# Patient Record
(demographics unavailable — no encounter records)

---

## 2025-02-06 NOTE — ASSESSMENT
[FreeTextEntry1] : 69 y/o male seen previous for gerd and constipation. Hx diverticulosis. Exertional tightness in chest followed by EST which resulted in recent LELAND to distal RCA July 6, 2023. Now has 8 stents total on asa and plavix. Last colonoscopy with GABE Torre MD 2018 with diverticulosis. His weight has been stable. He has been using MiraLAX every other day but still has some altered bowel movements. He complains of some epigastric discomfort in the morning despite using pantoprazole. There is no dark stool nausea or vomiting.  1/9/24; stools better with Mg Oxide. NO weight loss. NO sob or cp. Recent genetic test neg, given pts concern for pancreatic ca. CT chest with asc thoracic aorta 3.8 cm. We discussed as his last colonoscopy was in 2018 with several polyps, he should undergo surveillance colonoscopy this summer.  RTO 7/15/24 for 6 month follow up, GERD and colon cancer screening. Hx LELAND, currently on plavix. Patient has continued high fiber diet. Patient tried taking magnesium oxide for constipation, felt improvement for a short period of time. No relief from miralax. Patient is currently taking pantoprazole and famotidine, recently started feeling "hunger pangs" 30 minutes after consuming meals. Patient also takes zofran as needed for nausea. Patient reports that nausea has been worsening recently too. Denies sob, cp, diarrhea or rectal bleeding.   RTO 2/6/25 Has complaints of gerd, epigastric pain, mod severe despite famotidine. No dysphagia, no sob or cp.Has regular bms. Does minimal exercise. Reviewed previous labs with pt, with mild iron def. REcent colonoscopy reviewed with diverticulosis. EGD essentially neg.Bx with ? lymphoctyes-- will rule out celiac  IMP: 1. GERD 2. Epigastric pain 3. Diverticulosis  PLAN: 1.zofran prn nausea 2. Rabeprazole 20 mg daily 3. Cbc, cmp, iron studies, celiac, a1c, lipid 4. If sxs do not improve, will consider gastric emptying study  Thank you for allowing me to participate in this [facility] health care.

## 2025-02-06 NOTE — PHYSICAL EXAM

## 2025-02-06 NOTE — HISTORY OF PRESENT ILLNESS
[FreeTextEntry1] :  2/2018 3 dim polyps, diverticulosis. 2024 diverticluosis  [de-identified] : 6/2023 diverticulosis.

## 2025-02-07 NOTE — PHYSICAL EXAM
[General Appearance - Well Developed] : well developed [General Appearance - Well Nourished] : well nourished [Normal Appearance] : normal appearance [Well Groomed] : well groomed [General Appearance - In No Acute Distress] : no acute distress [Heart Rate And Rhythm] : heart rate and rhythm were normal [Edema] : no peripheral edema [Arterial Pulses Normal] : the pedal pulses were normal [Respiration, Rhythm And Depth] : normal respiratory rhythm and effort [Exaggerated Use Of Accessory Muscles For Inspiration] : no accessory muscle use [Auscultation Breath Sounds / Voice Sounds] : lungs were clear to auscultation bilaterally [Chest Palpation] : palpation of the chest revealed no abnormalities [Lungs Percussion] : the lungs were normal to percussion [Bowel Sounds] : normal bowel sounds [Abdomen Soft] : soft [Abdomen Tenderness] : non-tender [Abdomen Mass (___ Cm)] : no abdominal mass palpated [Abdomen Hernia] : no hernia was discovered [Costovertebral Angle Tenderness] : no ~M costovertebral angle tenderness [Urethral Meatus] : meatus normal [Penis Abnormality] : normal circumcised penis [Urinary Bladder Findings] : the bladder was normal on palpation [Scrotum] : the scrotum was normal [Rectal Exam - Seminal Vesicles] : the seminal vesicles were normal [Epididymis] : the epididymides were normal [Testes Tenderness] : no tenderness of the testes [Testes Mass (___cm)] : there were no testicular masses [Anus Abnormality] : the anus and perineum were normal [Rectal Exam - Rectum] : digital rectal exam was normal [Prostate Enlargement] : the prostate was not enlarged [Prostate Tenderness] : the prostate was not tender [Normal Station and Gait] : the gait and station were normal for the patient's age [Skin Color & Pigmentation] : normal skin color and pigmentation [Skin Turgor] : supple [] : no rash [No Focal Deficits] : no focal deficits [Sensation] : the sensory exam was normal to light touch and pinprick [Motor Exam] : the motor exam was normal [Oriented To Time, Place, And Person] : oriented to person, place, and time [Affect] : the affect was normal [Mood] : the mood was normal [Not Anxious] : not anxious [No Palpable Adenopathy] : no palpable adenopathy [Cervical Lymph Nodes Enlarged Posterior Bilaterally] : posterior cervical [Cervical Lymph Nodes Enlarged Anterior Bilaterally] : anterior cervical [Supraclavicular Lymph Nodes Enlarged Bilaterally] : supraclavicular [Axillary Lymph Nodes Enlarged Bilaterally] : axillary [Femoral Lymph Nodes Enlarged Bilaterally] : femoral [Inguinal Lymph Nodes Enlarged Bilaterally] : inguinal [de-identified] : perianal skin tag. Cosultation with colorectal surgeon advised

## 2025-02-07 NOTE — HISTORY OF PRESENT ILLNESS
[FreeTextEntry1] : The patient returns for follow-up.  Last seen October 2024.  At that time unfortunately, he has not been having success with penile self injection.  He stated he obtains bruises and swelling of his phallus.  He feels he is not injecting properly.  PMH: Patient initially presented with diabetes, CAD (8 stents), Hypercholesterol  who presents with a chief complaint of erectile dysfunction. He had a prostatectomy for BPH. He states that this has been present for the past 2 years. It causes both he and his partner great stress.  I reviewed the questionnaire he completed in detail. His erections are not modified with the degree of sexual stimulation.   He states that his erections presently are often less than 0 out of 10 in both tumescence and rigidity, insufficient for penetration.   He often ejaculates through a flaccid phallus.  He has difficulty maintaining an erection. He describes a normal libido.  His sexual dysfunction occurs with both sexual relations and masturbation.  His erections are not improved with PDE5 inhibitors.    His partner is understanding and was unable to be with him at the visit today. He is not  and has adult children.    The patient denies fevers, chills, nausea and/or vomiting and no unexplained weight loss.  His past medical history is non-contributory.  In his present occupation he has no known toxin exposure. He does not smoke and drinks socially.  He has no known drug allergies. His review of systems and past medical history demonstrates no significant urologic issues (see patient completed questionnaire). His family history demonstrates no significant urologic issues. A chaperone was offered to be present for the examination but declined by the patient.

## 2025-02-07 NOTE — ASSESSMENT
[FreeTextEntry1] : The patient returns for follow-up.  Last seen October 2024.  At that time unfortunately, he has not been having success with penile self injection.  He stated he obtains bruises and swelling of his phallus.  He feels he is not injecting properly.  Okay it is coming from [place]  Laboratory studies dated July 9, 2024 demonstrated a urinalysis with greater than 1000 mg/dL of glucose and trace ketones.  Hematocrit was 41.4% with a slightly low hemoglobin of 12.8 g/dL and medical evaluation was suggested.  His hemoglobin A1c is 6.8% and medical evaluation was suggested.  He had an elevated A1 to be lipoprotein ratio.  His PSA was 1.20 ng/mL, estradiol 25 pg/mL, prolactin 6.8 ng/mL TSH 3.70 IU/mL, LH 9.5 IU/L, vitamin D 25 was 39.7 ng/mL, testosterone free 3.8 pg/mL of total testosterone 411 ng/dL.  Penile duplex ultrasound demonstrated significant arteriogenic dysfunction. He is under cardiology care. He has 8 stents in place presently. He will be returning for injection training at 0.4 cc of the trimix.  Consultation: 35 minutes which excludes teaching and separately reported service but includes reviewing his history and discussing prior results, discussing various treatment options, writing medication prescriptions, requests for lab testing and writing his note. There was also additional time in preparing for the visit.

## 2025-02-07 NOTE — ASSESSMENT
[FreeTextEntry1] : The patient returns for follow-up.  Last seen October 2024.  At that time unfortunately, he has not been having success with penile self injection.  He stated he obtains bruises and swelling of his phallus.  He feels he is not injecting properly.  Okay it is coming from [place]  Laboratory studies dated July 9, 2024 demonstrated a urinalysis with greater than 1000 mg/dL of glucose and trace ketones.  Hematocrit was 41.4% with a slightly low hemoglobin of 12.8 g/dL and medical evaluation was suggested.  His hemoglobin A1c is 6.8% and medical evaluation was suggested.  He had an elevated A1 to be lipoprotein ratio.  His PSA was 1.20 ng/mL, estradiol 25 pg/mL, prolactin 6.8 ng/mL TSH 3.70 IU/mL, LH 9.5 IU/L, vitamin D 25 was 39.7 ng/mL, testosterone free 3.8 pg/mL of total testosterone 411 ng/dL.  Penile duplex ultrasound demonstrated significant arteriogenic dysfunction. He is under cardiology care. He has 8 stents in place presently. He will be returning for injection training at 0.4 cc of the trimix.  Consultation: 35 minutes which excludes teaching and separately reported service but includes reviewing his history and discussing prior results, discussing various treatment options, writing medication prescriptions, requests for lab testing and writing his note. There was also additional time in preparing for the visit.

## 2025-02-07 NOTE — PHYSICAL EXAM
[General Appearance - Well Developed] : well developed [General Appearance - Well Nourished] : well nourished [Normal Appearance] : normal appearance [Well Groomed] : well groomed [General Appearance - In No Acute Distress] : no acute distress [Heart Rate And Rhythm] : heart rate and rhythm were normal [Edema] : no peripheral edema [Arterial Pulses Normal] : the pedal pulses were normal [Respiration, Rhythm And Depth] : normal respiratory rhythm and effort [Exaggerated Use Of Accessory Muscles For Inspiration] : no accessory muscle use [Auscultation Breath Sounds / Voice Sounds] : lungs were clear to auscultation bilaterally [Chest Palpation] : palpation of the chest revealed no abnormalities [Lungs Percussion] : the lungs were normal to percussion [Bowel Sounds] : normal bowel sounds [Abdomen Soft] : soft [Abdomen Tenderness] : non-tender [Abdomen Mass (___ Cm)] : no abdominal mass palpated [Abdomen Hernia] : no hernia was discovered [Costovertebral Angle Tenderness] : no ~M costovertebral angle tenderness [Urethral Meatus] : meatus normal [Penis Abnormality] : normal circumcised penis [Urinary Bladder Findings] : the bladder was normal on palpation [Scrotum] : the scrotum was normal [Rectal Exam - Seminal Vesicles] : the seminal vesicles were normal [Epididymis] : the epididymides were normal [Testes Tenderness] : no tenderness of the testes [Testes Mass (___cm)] : there were no testicular masses [Anus Abnormality] : the anus and perineum were normal [Rectal Exam - Rectum] : digital rectal exam was normal [Prostate Enlargement] : the prostate was not enlarged [Prostate Tenderness] : the prostate was not tender [Normal Station and Gait] : the gait and station were normal for the patient's age [Skin Color & Pigmentation] : normal skin color and pigmentation [Skin Turgor] : supple [] : no rash [No Focal Deficits] : no focal deficits [Sensation] : the sensory exam was normal to light touch and pinprick [Motor Exam] : the motor exam was normal [Oriented To Time, Place, And Person] : oriented to person, place, and time [Affect] : the affect was normal [Mood] : the mood was normal [Not Anxious] : not anxious [No Palpable Adenopathy] : no palpable adenopathy [Cervical Lymph Nodes Enlarged Posterior Bilaterally] : posterior cervical [Cervical Lymph Nodes Enlarged Anterior Bilaterally] : anterior cervical [Supraclavicular Lymph Nodes Enlarged Bilaterally] : supraclavicular [Axillary Lymph Nodes Enlarged Bilaterally] : axillary [Femoral Lymph Nodes Enlarged Bilaterally] : femoral [Inguinal Lymph Nodes Enlarged Bilaterally] : inguinal [de-identified] : perianal skin tag. Cosultation with colorectal surgeon advised

## 2025-02-18 NOTE — HISTORY OF PRESENT ILLNESS
[FreeTextEntry1] : SHERRI is a 70 year M w/MHx of CAD s/p PCI, HLD, HTN, T2DM, SHIVAM, Asthma, GERD who presents for follow up. Last OV 8/19/2024. Notes L ACW discomfort that started over the last few months. Too w/ "shivering sensation of the my heart". Denies diaphoresis, vision changes, HA, dizziness, syncope, cough, wheezing, SOB/HALE, edema, fatigue, fever, chills, infection/UTI SXS.

## 2025-02-19 NOTE — HISTORY OF PRESENT ILLNESS
[FreeTextEntry1] : f/up for Type 2 diabetes mellitus  last A1c: 6.8%  Patient with past medical history as below, remarkable for HTN, HLD, CAD s/p PCI.    Screening  Ophthalmology: follows LDL: 48, on statin Microalbumin: Cr:  -ve EGFR: 107   Current diabetic medication regimen (verified with patient):  jardiance 25mg po daily glipizide ER 10mg daily janumet 50-1000mg po bid   SMBG ranges (glucometer):  avg <170,mg/dl   POCT at target  Reports recent less activity due to musculoskeletal issues   
0 = independent
5

## 2025-03-21 NOTE — HISTORY OF PRESENT ILLNESS
[FreeTextEntry1] : This is a 70 y/o male with a pmhx of CAD s/p PCI, HLD, HTN, T2DM, SHIVAM, Asthma, GERD who presents for follow up.  Patient had echo 2/2025 with EF 66%, trace MR, mild TR, mild CA, trace AR, dilated aorta.  STN 2/2025 with mild apical ischemia. Extended holter dated 2/9/25- 2/26/25 showing Primary Rhythm Sinus Rhythm, average HR 79, Min HR 55, Max , short runs of SVT, 3 events of Vtach, longest event 8 beats, fastest event 127 BPM. Patient was started on Toprol 25 mg po BID. Patient is s/p cath 3/10/25 with successful PCI with LELAND to mid LAD. Prior stents are patent. Patient reported fatigue on Toprol 25 mg po BID, was told to reduce to qd. Patient reports not feeling well. He reports palpitations. He also report dizziness. Patient reports fleeting sharp chest pain which occurs at random, lasts a few seconds and resolves. Patient also reports difficulty sleeping on left side due to discomfort in chest.  Patient reports SOB which can happen any time.

## 2025-04-06 NOTE — PHYSICAL EXAM
[General Appearance - Well Developed] : well developed [General Appearance - Well Nourished] : well nourished [Normal Appearance] : normal appearance [Well Groomed] : well groomed [General Appearance - In No Acute Distress] : no acute distress [Heart Rate And Rhythm] : heart rate and rhythm were normal [Edema] : no peripheral edema [Arterial Pulses Normal] : the pedal pulses were normal [Exaggerated Use Of Accessory Muscles For Inspiration] : no accessory muscle use [Respiration, Rhythm And Depth] : normal respiratory rhythm and effort [Auscultation Breath Sounds / Voice Sounds] : lungs were clear to auscultation bilaterally [Chest Palpation] : palpation of the chest revealed no abnormalities [Lungs Percussion] : the lungs were normal to percussion [Bowel Sounds] : normal bowel sounds [Abdomen Soft] : soft [Abdomen Tenderness] : non-tender [Abdomen Mass (___ Cm)] : no abdominal mass palpated [Abdomen Hernia] : no hernia was discovered [Costovertebral Angle Tenderness] : no ~M costovertebral angle tenderness [Urethral Meatus] : meatus normal [Penis Abnormality] : normal circumcised penis [Urinary Bladder Findings] : the bladder was normal on palpation [Scrotum] : the scrotum was normal [Rectal Exam - Seminal Vesicles] : the seminal vesicles were normal [Epididymis] : the epididymides were normal [Testes Tenderness] : no tenderness of the testes [Anus Abnormality] : the anus and perineum were normal [Testes Mass (___cm)] : there were no testicular masses [Rectal Exam - Rectum] : digital rectal exam was normal [Prostate Enlargement] : the prostate was not enlarged [Prostate Tenderness] : the prostate was not tender [Normal Station and Gait] : the gait and station were normal for the patient's age [Skin Color & Pigmentation] : normal skin color and pigmentation [Skin Turgor] : supple [] : no rash [No Focal Deficits] : no focal deficits [Sensation] : the sensory exam was normal to light touch and pinprick [Motor Exam] : the motor exam was normal [Oriented To Time, Place, And Person] : oriented to person, place, and time [Affect] : the affect was normal [Mood] : the mood was normal [Not Anxious] : not anxious [No Palpable Adenopathy] : no palpable adenopathy [Cervical Lymph Nodes Enlarged Posterior Bilaterally] : posterior cervical [Supraclavicular Lymph Nodes Enlarged Bilaterally] : supraclavicular [Cervical Lymph Nodes Enlarged Anterior Bilaterally] : anterior cervical [Axillary Lymph Nodes Enlarged Bilaterally] : axillary [Femoral Lymph Nodes Enlarged Bilaterally] : femoral [Inguinal Lymph Nodes Enlarged Bilaterally] : inguinal [de-identified] : perianal skin tag. Cosultation with colorectal surgeon advised [Chaperone Present] : A chaperone was present in the examining room during all aspects of the physical examination [FreeTextEntry3] : MA

## 2025-04-06 NOTE — HISTORY OF PRESENT ILLNESS
[FreeTextEntry1] : The patient returns for follow-up.  Last seen 2025.  He had not been having success with penile self injection.  He feels he is not injecting properly. He also had been using  medication which I cautioned him about.  PMH: Patient initially presented with diabetes, CAD (8 stents), Hypercholesterol  who presents with a chief complaint of erectile dysfunction. He had a prostatectomy for BPH. He states that this has been present for the past 2 years. It causes both he and his partner great stress.  I reviewed the questionnaire he completed in detail. His erections are not modified with the degree of sexual stimulation.   He states that his erections presently are often less than 0 out of 10 in both tumescence and rigidity, insufficient for penetration.   He often ejaculates through a flaccid phallus.  He has difficulty maintaining an erection. He describes a normal libido.  His sexual dysfunction occurs with both sexual relations and masturbation.  His erections are not improved with PDE5 inhibitors.    His partner is understanding and was unable to be with him at the visit today. He is not  and has adult children.    The patient denies fevers, chills, nausea and/or vomiting and no unexplained weight loss.  His past medical history is non-contributory.  In his present occupation he has no known toxin exposure. He does not smoke and drinks socially.  He has no known drug allergies. His review of systems and past medical history demonstrates no significant urologic issues (see patient completed questionnaire). His family history demonstrates no significant urologic issues. A chaperone was offered to be present for the examination but declined by the patient.

## 2025-04-06 NOTE — ASSESSMENT
[FreeTextEntry1] : The patient returns for follow-up.  Last seen 2025.  He had not been having success with penile self injection.  He feels he is not injecting properly. He also had been using  medication which I cautioned him about.  Laboratory studies dated 2024 demonstrated a urinalysis with greater than 1000 mg/dL of glucose and trace ketones.  Hematocrit was 41.4% with a slightly low hemoglobin of 12.8 g/dL and medical evaluation was suggested.  His hemoglobin A1c is 6.8% and medical evaluation was suggested.  He had an elevated A1 to be lipoprotein ratio.  His PSA was 1.20 ng/mL, estradiol 25 pg/mL, prolactin 6.8 ng/mL TSH 3.70 IU/mL, LH 9.5 IU/L, vitamin D 25 was 39.7 ng/mL, testosterone free 3.8 pg/mL of total testosterone 411 ng/dL.  Penile duplex ultrasound demonstrated significant arteriogenic dysfunction. He is under cardiology care. He has 8 stents in place presently.  He returns for injection training at 0.5 to 0.6 cc of the trimix.He will also be using Tadalafil 20 mg three times a week. We will discuss the efficacy of this combined technique in 4 to 6 weeks.  Consultation: 35 minutes which excludes teaching and separately reported service but includes reviewing his history and discussing prior results, discussing various treatment options, writing medication prescriptions and writing his note. There was also additional time in preparing for the visit.

## 2025-05-28 NOTE — PHYSICAL EXAM
[Alert] : alert [Normal Voice/Communication] : normal voice/communication [Healthy Appearing] : healthy appearing [No Acute Distress] : no acute distress [Sclera] : the sclera and conjunctiva were normal [Hearing Threshold Finger Rub Not [place]] : hearing was normal [Normal Lips/Gums] : the lips and gums were normal [Oropharynx] : the oropharynx was normal [Normal Appearance] : the appearance of the neck was normal [No Neck Mass] : no neck mass was observed [No Respiratory Distress] : no respiratory distress [No Acc Muscle Use] : no accessory muscle use [Respiration, Rhythm And Depth] : normal respiratory rhythm and effort [Auscultation Breath Sounds / Voice Sounds] : lungs were clear to auscultation bilaterally [Heart Rate And Rhythm] : heart rate was normal and rhythm regular [Normal S1, S2] : normal S1 and S2 [Murmurs] : no murmurs [Bowel Sounds] : normal bowel sounds [Abdomen Tenderness] : non-tender [No Masses] : no abdominal mass palpated [Abdomen Soft] : soft [] : no hepatosplenomegaly [Oriented To Time, Place, And Person] : oriented to person, place, and time

## 2025-05-28 NOTE — PHYSICAL EXAM
[Alert] : alert [Normal Voice/Communication] : normal voice/communication [Healthy Appearing] : healthy appearing [No Acute Distress] : no acute distress [Sclera] : the sclera and conjunctiva were normal [Hearing Threshold Finger Rub Not [place]] : hearing was normal [Normal Lips/Gums] : the lips and gums were normal [Oropharynx] : the oropharynx was normal [Normal Appearance] : the appearance of the neck was normal [No Neck Mass] : no neck mass was observed [No Respiratory Distress] : no respiratory distress [No Acc Muscle Use] : no accessory muscle use [Respiration, Rhythm And Depth] : normal respiratory rhythm and effort [Auscultation Breath Sounds / Voice Sounds] : lungs were clear to auscultation bilaterally [Heart Rate And Rhythm] : heart rate was normal and rhythm regular [Normal S1, S2] : normal S1 and S2 [Murmurs] : no murmurs [Bowel Sounds] : normal bowel sounds [Abdomen Tenderness] : non-tender [No Masses] : no abdominal mass palpated [Abdomen Soft] : soft [] : no hepatosplenomegaly [Oriented To Time, Place, And Person] : oriented to person, place, and time

## 2025-05-28 NOTE — HISTORY OF PRESENT ILLNESS
[FreeTextEntry1] :  2/2018 3 dim polyps, diverticulosis. 2024 diverticluosis  [de-identified] : 03/2025 [de-identified] : 6/2023 diverticulosis.

## 2025-05-28 NOTE — REVIEW OF SYSTEMS
[As Noted in HPI] : as noted in HPI [Heartburn] : heartburn [Negative] : Heme/Lymph [Abdominal Pain] : no abdominal pain

## 2025-05-28 NOTE — PHYSICAL EXAM
[Alert] : alert [Normal Voice/Communication] : normal voice/communication [Healthy Appearing] : healthy appearing [No Acute Distress] : no acute distress [Sclera] : the sclera and conjunctiva were normal [Hearing Threshold Finger Rub Not [place]] : hearing was normal [Normal Lips/Gums] : the lips and gums were normal [Oropharynx] : the oropharynx was normal [Normal Appearance] : the appearance of the neck was normal [No Neck Mass] : no neck mass was observed [No Respiratory Distress] : no respiratory distress [No Acc Muscle Use] : no accessory muscle use [Respiration, Rhythm And Depth] : normal respiratory rhythm and effort [Auscultation Breath Sounds / Voice Sounds] : lungs were clear to auscultation bilaterally [Heart Rate And Rhythm] : heart rate was normal and rhythm regular [Normal S1, S2] : normal S1 and S2 [Murmurs] : no murmurs [Bowel Sounds] : normal bowel sounds [Abdomen Tenderness] : non-tender [No Masses] : no abdominal mass palpated [Abdomen Soft] : soft [] : no hepatosplenomegaly [Oriented To Time, Place, And Person] : oriented to person, place, and time

## 2025-05-28 NOTE — ASSESSMENT
[FreeTextEntry1] : 72 y/o male seen previous for gerd and constipation. Hx diverticulosis. Exertional tightness in chest followed by EST which resulted in recent LELAND to distal RCA July 6, 2023. Now has 8 stents total on asa and plavix. Last colonoscopy with GABE Torre MD 2018 with diverticulosis. His weight has been stable. He has been using MiraLAX every other day but still has some altered bowel movements. He complains of some epigastric discomfort in the morning despite using pantoprazole. There is no dark stool nausea or vomiting.  1/9/24; stools better with Mg Oxide. NO weight loss. NO sob or cp. Recent genetic test neg, given pts concern for pancreatic ca. CT chest with asc thoracic aorta 3.8 cm. We discussed as his last colonoscopy was in 2018 with several polyps, he should undergo surveillance colonoscopy this summer.  RTO 7/15/24 for 6 month follow up, GERD and colon cancer screening. Hx LELAND, currently on plavix. Patient has continued high fiber diet. Patient tried taking magnesium oxide for constipation, felt improvement for a short period of time. No relief from miralax. Patient is currently taking pantoprazole and famotidine, recently started feeling "hunger pangs" 30 minutes after consuming meals. Patient also takes zofran as needed for nausea. Patient reports that nausea has been worsening recently too. Denies sob, cp, diarrhea or rectal bleeding.   RTO 2/6/25 Has complaints of gerd, epigastric pain, mod severe despite famotidine. No dysphagia, no sob or cp.Has regular bms. Does minimal exercise. Reviewed previous labs with pt, with mild iron def. REcent colonoscopy reviewed with diverticulosis. EGD essentially neg.Bx with ? lymphoctyes-- will rule out celiac.   RTO 5/28/25- last hb 13. reviewed EGD/colonoscopy 2024 essentially normal VCE normal. Has gerd despite rabeprazole, possible gastroparesis, due to long standing DM. Uses iron 325 mg daily. NO constipation. Returned from [facility] recently, no cp or sob. Previous celiac abs normal  IMP: 1. GERD, anemia 2. Epigastric pain 3. Diverticulosis  PLAN: 1.cbc, cmp, a1c 2. Change to voquezna 20 mg daily x 3months 3. RTO 3 months 4. If no improvement in gerd, will pursue gastric emptying study  Thank you for allowing me to participate in this [facility] health care.

## 2025-05-30 NOTE — ASSESSMENT
[FreeTextEntry1] : 69 y/o male seen previous for gerd and constipation. Hx diverticulosis. Exertional tightness in chest followed by EST which resulted in recent LELAND to distal RCA July 6, 2023. Now has 8 stents total on asa and plavix. Last colonoscopy with GABE Torre MD 2018 with diverticulosis. His weight has been stable. He has been using MiraLAX every other day but still has some altered bowel movements. He complains of some epigastric discomfort in the morning despite using pantoprazole. There is no dark stool nausea or vomiting.  1/9/24; stools better with Mg Oxide. NO weight loss. NO sob or cp. Recent genetic test neg, given pts concern for pancreatic ca. CT chest with asc thoracic aorta 3.8 cm. We discussed as his last colonoscopy was in 2018 with several polyps, he should undergo surveillance colonoscopy this summer.  RTO 7/15/24 for 6 month follow up, GERD and colon cancer screening. Hx LELAND, currently on plavix. Patient has continued high fiber diet. Patient tried taking magnesium oxide for constipation, felt improvement for a short period of time. No relief from miralax. Patient is currently taking pantoprazole and famotidine, recently started feeling "hunger pangs" 30 minutes after consuming meals. Patient also takes zofran as needed for nausea. Patient reports that nausea has been worsening recently too. Denies sob, cp, diarrhea or rectal bleeding.   RTO 2/6/25 Has complaints of gerd, epigastric pain, mod severe despite famotidine. No dysphagia, no sob or cp.Has regular bms. Does minimal exercise. Reviewed previous labs with pt, with mild iron def. REcent colonoscopy reviewed with diverticulosis. EGD essentially neg.Bx with ? lymphoctyes-- will rule out celiac  IMP: 1. GERD 2. Epigastric pain 3. Diverticulosis  PLAN: 1.zofran prn nausea 2. Rabeprazole 20 mg daily 3. Cbc, cmp, iron studies, celiac, a1c, lipid 4. If sxs do not improve, will consider gastric emptying study  Thank you for allowing me to participate in this [facility] health care.

## 2025-05-30 NOTE — HISTORY OF PRESENT ILLNESS
[FreeTextEntry1] :  2/2018 3 dim polyps, diverticulosis. 2024 diverticluosis  [de-identified] : 6/2023 diverticulosis.

## 2025-05-30 NOTE — ASSESSMENT
[FreeTextEntry1] : 71 y/o male seen previous for gerd and constipation. Hx diverticulosis. Exertional tightness in chest followed by EST which resulted in recent LELAND to distal RCA July 6, 2023. Now has 8 stents total on asa and plavix. Last colonoscopy with GABE Torre MD 2018 with diverticulosis. His weight has been stable. He has been using MiraLAX every other day but still has some altered bowel movements. He complains of some epigastric discomfort in the morning despite using pantoprazole. There is no dark stool nausea or vomiting.  1/9/24; stools better with Mg Oxide. NO weight loss. NO sob or cp. Recent genetic test neg, given pts concern for pancreatic ca. CT chest with asc thoracic aorta 3.8 cm. We discussed as his last colonoscopy was in 2018 with several polyps, he should undergo surveillance colonoscopy this summer.  RTO 7/15/24 for 6 month follow up, GERD and colon cancer screening. Hx LELAND, currently on plavix. Patient has continued high fiber diet. Patient tried taking magnesium oxide for constipation, felt improvement for a short period of time. No relief from miralax. Patient is currently taking pantoprazole and famotidine, recently started feeling "hunger pangs" 30 minutes after consuming meals. Patient also takes zofran as needed for nausea. Patient reports that nausea has been worsening recently too. Denies sob, cp, diarrhea or rectal bleeding.   RTO 2/6/25 Has complaints of gerd, epigastric pain, mod severe despite famotidine. No dysphagia, no sob or cp.Has regular bms. Does minimal exercise. Reviewed previous labs with pt, with mild iron def. REcent colonoscopy reviewed with diverticulosis. EGD essentially neg.Bx with ? lymphoctyes-- will rule out celiac  IMP: 1. GERD 2. Epigastric pain 3. Diverticulosis  PLAN: 1.zofran prn nausea 2. Rabeprazole 20 mg daily 3. Cbc, cmp, iron studies, celiac, a1c, lipid 4. If sxs do not improve, will consider gastric emptying study  Thank you for allowing me to participate in this [facility] health care.

## 2025-06-18 NOTE — HISTORY OF PRESENT ILLNESS
[FreeTextEntry1] : This is a 70 y/o male with a pmhx of  CAD s/p PCI, HLD, HTN, T2DM, SHIVAM, Asthma, GERD who presents for follow up. Patient has been taking Ramipril 10 mg po qd and reports feeling better. Patient reports occasional left sided chest pressure/pulling. Patient denies, dyspnea, palpitations, dizziness, syncope, changes in bowel/bladder habits or appetite.

## 2025-06-20 NOTE — HISTORY OF PRESENT ILLNESS
[FreeTextEntry1] : f/up for Type 2 diabetes mellitus  last A1c: 7.4%  Patient with past medical history as below, remarkable for HTN, HLD, CAD s/p PCI.    Screening  Ophthalmology: follows LDL: on statin Microalbumin: Cr:  -ve EGFR: 107   Current diabetic medication regimen (verified with patient):  jardiance 25mg po daily glipizide ER 10mg daily janumet 50-1000mg po bid